# Patient Record
Sex: MALE | Race: OTHER | NOT HISPANIC OR LATINO | URBAN - METROPOLITAN AREA
[De-identification: names, ages, dates, MRNs, and addresses within clinical notes are randomized per-mention and may not be internally consistent; named-entity substitution may affect disease eponyms.]

---

## 2018-02-15 ENCOUNTER — EMERGENCY (EMERGENCY)
Facility: HOSPITAL | Age: 23
LOS: 1 days | Discharge: HOME | End: 2018-02-15
Attending: EMERGENCY MEDICINE | Admitting: EMERGENCY MEDICINE
Payer: MEDICAID

## 2018-02-15 VITALS
HEART RATE: 96 BPM | TEMPERATURE: 98 F | SYSTOLIC BLOOD PRESSURE: 144 MMHG | OXYGEN SATURATION: 100 % | DIASTOLIC BLOOD PRESSURE: 82 MMHG | RESPIRATION RATE: 16 BRPM

## 2018-02-15 PROCEDURE — 99283 EMERGENCY DEPT VISIT LOW MDM: CPT | Mod: 25

## 2018-02-15 NOTE — ED ADULT TRIAGE NOTE - CHIEF COMPLAINT QUOTE
Pt comes in for c/o bleeding from mouth after a bump in mouth pooped. Pt reports that he thinks his tooth must have hit it and he has been bleeding continuously for the last 3 hrs. Pt appears in no acute distress, vs as noted. Pt reporting he feels SOB, breathing unlabored, denies dizziness or chest pain. Pt denies any pmhx, EKG to be completed.

## 2018-02-16 RX ORDER — IBUPROFEN 200 MG
400 TABLET ORAL ONCE
Qty: 0 | Refills: 0 | Status: COMPLETED | OUTPATIENT
Start: 2018-02-16 | End: 2018-02-16

## 2018-02-16 RX ADMIN — Medication 400 MILLIGRAM(S): at 01:08

## 2018-02-16 NOTE — ED ADULT NURSE NOTE - OBJECTIVE STATEMENT
pt c/o of "bleeding pimple in mouth" that began around 8pm and has since subsided. pt has no significant PMH, a/ox3, ambulatory in NAD. MD at bedside, awaiting further orders from MD, will continue to monitor, pt safety maintained.

## 2018-02-16 NOTE — ED PROVIDER NOTE - OBJECTIVE STATEMENT
23 yo M no significant pmhx p/w bleeding from "pimple" in mouth that began around 8 pm tonight. Bleeding continued and subsided, but started to bleed again after eating dinner. Denies history of bleeding/clotting disorders, no history bleeding after procedures, no family history of bleeding disorders. No histroy of 21 yo M no significant pmhx p/w bleeding from "pimple" in mouth that began around 8 pm tonight. Bleeding continued and subsided, but started to bleed again after eating dinner. Denies history of bleeding/clotting disorders, no history bleeding after procedures, no family history of bleeding disorders. Denies lightheadedness/chest pain/shortness of breath. 23 yo M no significant pmhx p/w bleeding from "pimple" in mouth that began around 8 pm tonight. Bleeding continued and subsided, but started to bleed again after eating dinner. Denies history of bleeding/clotting disorders, no history bleeding after procedures, no family history of bleeding disorders. Denies lightheadedness/chest pain/shortness of breath.  [place]: 22M no PMH p/w bleeding fom growth in mouth since ~2000, now resolved. Had very brief palpitations/anxiety, now resolved. Currently asymptomatic. Noticed growth in mouth x1-2. Denies f/c, bleeding from elsewhere, lightheaded, rashes, NVD, SOB/CP.

## 2018-02-16 NOTE — ED PROVIDER NOTE - ATTENDING CONTRIBUTION TO CARE
22M no PMH p/w bleeding from mouth now completely resolved. Asymptomatic. Vitals wnl, exam as above.  ddx: CLinically not infectious or anemic. Likely ?skin tag.   Comfortable for dc, outpt pmd/oral surgeon f/u, tylenol/motrin prn.

## 2018-02-16 NOTE — ED PROVIDER NOTE - ENMT, MLM
Airway patent, Nasal mucosa clear. Mouth with normal mucosa, polyp on mucosal surface R lower mandible, no bleeding. Airway patent, Nasal mucosa clear. Mouth with normal mucosa, polyp? (looks like 0.3cm skin tag) on mucosal surface R lower mandible, no bleeding. no erythema or swelling.
